# Patient Record
Sex: FEMALE | Race: OTHER | URBAN - NONMETROPOLITAN AREA
[De-identification: names, ages, dates, MRNs, and addresses within clinical notes are randomized per-mention and may not be internally consistent; named-entity substitution may affect disease eponyms.]

---

## 2019-07-24 ENCOUNTER — APPOINTMENT (RX ONLY)
Dept: URBAN - NONMETROPOLITAN AREA CLINIC 19 | Facility: CLINIC | Age: 69
Setting detail: DERMATOLOGY
End: 2019-07-24

## 2019-07-24 DIAGNOSIS — L20.89 OTHER ATOPIC DERMATITIS: ICD-10-CM

## 2019-07-24 PROBLEM — F41.9 ANXIETY DISORDER, UNSPECIFIED: Status: ACTIVE | Noted: 2019-07-24

## 2019-07-24 PROBLEM — F32.9 MAJOR DEPRESSIVE DISORDER, SINGLE EPISODE, UNSPECIFIED: Status: ACTIVE | Noted: 2019-07-24

## 2019-07-24 PROBLEM — D48.5 NEOPLASM OF UNCERTAIN BEHAVIOR OF SKIN: Status: ACTIVE | Noted: 2019-07-24

## 2019-07-24 PROBLEM — M12.9 ARTHROPATHY, UNSPECIFIED: Status: ACTIVE | Noted: 2019-07-24

## 2019-07-24 PROCEDURE — 99202 OFFICE O/P NEW SF 15 MIN: CPT | Mod: 25

## 2019-07-24 PROCEDURE — 11104 PUNCH BX SKIN SINGLE LESION: CPT

## 2019-07-24 PROCEDURE — ? BIOPSY BY PUNCH METHOD

## 2019-07-24 PROCEDURE — ? PRESCRIPTION

## 2019-07-24 PROCEDURE — 11105 PUNCH BX SKIN EA SEP/ADDL: CPT

## 2019-07-24 PROCEDURE — ? COUNSELING

## 2019-07-24 RX ORDER — HYDROCORTISONE VALERATE 2 MG/G
CREAM TOPICAL
Qty: 1 | Refills: 1 | Status: ERX | COMMUNITY
Start: 2019-07-24

## 2019-07-24 RX ORDER — TRIAMCINOLONE ACETONIDE 1 MG/G
CREAM TOPICAL BID
Qty: 1 | Refills: 2 | Status: ERX | COMMUNITY
Start: 2019-07-24

## 2019-07-24 RX ADMIN — HYDROCORTISONE VALERATE: 2 CREAM TOPICAL at 20:05

## 2019-07-24 RX ADMIN — TRIAMCINOLONE ACETONIDE: 1 CREAM TOPICAL at 20:05

## 2019-07-24 ASSESSMENT — LOCATION ZONE DERM
LOCATION ZONE: LEG
LOCATION ZONE: TRUNK
LOCATION ZONE: FACE

## 2019-07-24 ASSESSMENT — LOCATION SIMPLE DESCRIPTION DERM
LOCATION SIMPLE: RIGHT PRETIBIAL REGION
LOCATION SIMPLE: LEFT CHEEK
LOCATION SIMPLE: UPPER BACK
LOCATION SIMPLE: LEFT PRETIBIAL REGION
LOCATION SIMPLE: ABDOMEN

## 2019-07-24 ASSESSMENT — LOCATION DETAILED DESCRIPTION DERM
LOCATION DETAILED: EPIGASTRIC SKIN
LOCATION DETAILED: LEFT PROXIMAL PRETIBIAL REGION
LOCATION DETAILED: INFERIOR THORACIC SPINE
LOCATION DETAILED: LEFT INFERIOR CENTRAL MALAR CHEEK
LOCATION DETAILED: RIGHT PROXIMAL PRETIBIAL REGION

## 2019-07-24 NOTE — PROCEDURE: BIOPSY BY PUNCH METHOD
Biopsy Type: H and E
Hemostasis: None
Post-Care Instructions: I reviewed with the patient in detail post-care instructions. Patient is to keep the biopsy site dry overnight, and then apply bacitracin twice daily until healed. Patient may apply hydrogen peroxide soaks to remove any crusting.
Dressing: bandage
Bill For Surgical Tray: no
Lab Facility: [id number]
Anesthesia Type: 1% lidocaine with epinephrine
Size Of Lesion In Cm (Optional): 0.8
Home Suture Removal Text: Patient was provided a home suture removal kit and will remove their sutures at home.  If they have any questions or difficulties they will call the office.
Additional Anesthesia Volume In Cc (Will Not Render If 0): 0
Consent: Written consent was obtained and risks were reviewed including but not limited to scarring, infection, bleeding, scabbing, incomplete removal, nerve damage and allergy to anesthesia.
Suture Removal: 14 days
Notification Instructions: Patient will be notified of biopsy results. However, patient instructed to call the office if not contacted within 2 weeks.
Punch Size In Mm: 3
Detail Level: Detailed
Wound Care: Petrolatum
Anesthesia Volume In Cc (Will Not Render If 0): 0.5
Billing Type: Third-Party Bill
Epidermal Sutures: 6-0 Ethilon
Was A Bandage Applied: Yes
Lab: [id number]
X Size Of Lesion In Cm (Optional): 0.7
Billing Type: Third-Party Bill
Epidermal Sutures: 4-0 Ethilon
Lab: [id number]
Lab Facility: [id number]
Home Suture Removal Text: Patient was provided a home suture removal kit and will remove their sutures at home.  If they have any questions or difficulties they will call the office.

## 2019-07-31 ENCOUNTER — APPOINTMENT (RX ONLY)
Dept: URBAN - NONMETROPOLITAN AREA CLINIC 19 | Facility: CLINIC | Age: 69
Setting detail: DERMATOLOGY
End: 2019-07-31

## 2019-07-31 DIAGNOSIS — Z48.02 ENCOUNTER FOR REMOVAL OF SUTURES: ICD-10-CM

## 2019-07-31 DIAGNOSIS — L72.0 EPIDERMAL CYST: ICD-10-CM

## 2019-07-31 PROCEDURE — ? COUNSELING

## 2019-07-31 PROCEDURE — ? PRESCRIPTION

## 2019-07-31 PROCEDURE — 99212 OFFICE O/P EST SF 10 MIN: CPT

## 2019-07-31 PROCEDURE — ? SUTURE REMOVAL (GLOBAL PERIOD)

## 2019-07-31 RX ORDER — TRETINOIN 0.5 MG/G
CREAM TOPICAL
Qty: 1 | Refills: 3 | Status: ERX | COMMUNITY
Start: 2019-07-31

## 2019-07-31 RX ADMIN — TRETINOIN: 0.5 CREAM TOPICAL at 17:43

## 2019-07-31 ASSESSMENT — LOCATION SIMPLE DESCRIPTION DERM
LOCATION SIMPLE: LEFT CHEEK
LOCATION SIMPLE: RIGHT CHEEK

## 2019-07-31 ASSESSMENT — LOCATION DETAILED DESCRIPTION DERM
LOCATION DETAILED: LEFT CENTRAL MALAR CHEEK
LOCATION DETAILED: RIGHT INFERIOR MEDIAL MALAR CHEEK

## 2019-07-31 ASSESSMENT — LOCATION ZONE DERM: LOCATION ZONE: FACE

## 2019-07-31 NOTE — PROCEDURE: SUTURE REMOVAL (GLOBAL PERIOD)
Detail Level: Detailed
Add 17857 Cpt? (Important Note: In 2017 The Use Of 49471 Is Being Tracked By Cms To Determine Future Global Period Reimbursement For Global Periods): no